# Patient Record
Sex: FEMALE | Race: OTHER | Employment: FULL TIME | URBAN - METROPOLITAN AREA
[De-identification: names, ages, dates, MRNs, and addresses within clinical notes are randomized per-mention and may not be internally consistent; named-entity substitution may affect disease eponyms.]

---

## 2019-08-23 ENCOUNTER — HOSPITAL ENCOUNTER (EMERGENCY)
Age: 34
Discharge: HOME | End: 2019-08-23

## 2019-08-23 VITALS
WEIGHT: 140 LBS | RESPIRATION RATE: 16 BRPM | HEIGHT: 60 IN | OXYGEN SATURATION: 99 % | SYSTOLIC BLOOD PRESSURE: 133 MMHG | DIASTOLIC BLOOD PRESSURE: 81 MMHG | TEMPERATURE: 98.2 F | BODY MASS INDEX: 27.48 KG/M2 | HEART RATE: 71 BPM

## 2019-08-23 DIAGNOSIS — N75.1 BARTHOLIN'S GLAND ABSCESS: Primary | ICD-10-CM

## 2019-08-23 PROCEDURE — 6360000002 HC RX W HCPCS: Performed by: PHYSICIAN ASSISTANT

## 2019-08-23 PROCEDURE — 99282 EMERGENCY DEPT VISIT SF MDM: CPT

## 2019-08-23 PROCEDURE — 4500000022 HC ED LEVEL 2 PROCEDURE

## 2019-08-23 PROCEDURE — 90471 IMMUNIZATION ADMIN: CPT | Performed by: PHYSICIAN ASSISTANT

## 2019-08-23 PROCEDURE — 90715 TDAP VACCINE 7 YRS/> IM: CPT | Performed by: PHYSICIAN ASSISTANT

## 2019-08-23 RX ORDER — CEPHALEXIN 500 MG/1
500 CAPSULE ORAL 4 TIMES DAILY
Qty: 40 CAPSULE | Refills: 0 | Status: SHIPPED | OUTPATIENT
Start: 2019-08-23 | End: 2019-09-02

## 2019-08-23 RX ORDER — SULFAMETHOXAZOLE AND TRIMETHOPRIM 800; 160 MG/1; MG/1
1 TABLET ORAL 2 TIMES DAILY
Qty: 20 TABLET | Refills: 0 | Status: SHIPPED | OUTPATIENT
Start: 2019-08-23 | End: 2019-09-02

## 2019-08-23 RX ORDER — NAPROXEN 500 MG/1
500 TABLET ORAL 2 TIMES DAILY PRN
Qty: 20 TABLET | Refills: 0 | Status: SHIPPED | OUTPATIENT
Start: 2019-08-23 | End: 2019-09-02

## 2019-08-23 RX ADMIN — TETANUS TOXOID, REDUCED DIPHTHERIA TOXOID AND ACELLULAR PERTUSSIS VACCINE, ADSORBED 0.5 ML: 5; 2.5; 8; 8; 2.5 SUSPENSION INTRAMUSCULAR at 07:32

## 2019-08-23 ASSESSMENT — ENCOUNTER SYMPTOMS
COLOR CHANGE: 1
SHORTNESS OF BREATH: 0
ABDOMINAL PAIN: 0
DIARRHEA: 0
NAUSEA: 0
CHEST TIGHTNESS: 0
VOMITING: 0

## 2019-08-23 ASSESSMENT — PAIN SCALES - GENERAL: PAINLEVEL_OUTOF10: 8

## 2021-05-20 ENCOUNTER — HOSPITAL ENCOUNTER (EMERGENCY)
Age: 36
Discharge: HOME | End: 2021-05-20

## 2021-05-20 VITALS
DIASTOLIC BLOOD PRESSURE: 61 MMHG | SYSTOLIC BLOOD PRESSURE: 118 MMHG | RESPIRATION RATE: 16 BRPM | HEART RATE: 84 BPM | OXYGEN SATURATION: 100 % | TEMPERATURE: 99.5 F

## 2021-05-20 DIAGNOSIS — N75.0 INFECTED CYST OF BARTHOLIN'S GLAND DUCT: Primary | ICD-10-CM

## 2021-05-20 PROCEDURE — 56420 I&D BARTHOLINS GLAND ABSCESS: CPT

## 2021-05-20 PROCEDURE — 99283 EMERGENCY DEPT VISIT LOW MDM: CPT

## 2021-05-20 PROCEDURE — 6370000000 HC RX 637 (ALT 250 FOR IP): Performed by: NURSE PRACTITIONER

## 2021-05-20 RX ORDER — SODIUM BICARBONATE 42 MG/ML
INJECTION, SOLUTION INTRAVENOUS
Status: DISCONTINUED
Start: 2021-05-20 | End: 2021-05-20 | Stop reason: HOSPADM

## 2021-05-20 RX ORDER — IBUPROFEN 800 MG/1
800 TABLET ORAL EVERY 8 HOURS PRN
Qty: 20 TABLET | Refills: 0 | Status: SHIPPED | OUTPATIENT
Start: 2021-05-20 | End: 2022-07-06

## 2021-05-20 RX ORDER — LIDOCAINE HYDROCHLORIDE AND EPINEPHRINE BITARTRATE 20; .01 MG/ML; MG/ML
INJECTION, SOLUTION SUBCUTANEOUS
Status: DISCONTINUED
Start: 2021-05-20 | End: 2021-05-20 | Stop reason: HOSPADM

## 2021-05-20 RX ORDER — IBUPROFEN 800 MG/1
800 TABLET ORAL ONCE
Status: COMPLETED | OUTPATIENT
Start: 2021-05-20 | End: 2021-05-20

## 2021-05-20 RX ORDER — HYDROCODONE BITARTRATE AND ACETAMINOPHEN 5; 325 MG/1; MG/1
1 TABLET ORAL EVERY 6 HOURS PRN
Qty: 10 TABLET | Refills: 0 | Status: SHIPPED | OUTPATIENT
Start: 2021-05-20 | End: 2021-05-23

## 2021-05-20 RX ORDER — SULFAMETHOXAZOLE AND TRIMETHOPRIM 800; 160 MG/1; MG/1
1 TABLET ORAL 2 TIMES DAILY
Qty: 20 TABLET | Refills: 0 | Status: SHIPPED | OUTPATIENT
Start: 2021-05-20 | End: 2021-05-30

## 2021-05-20 RX ORDER — SULFAMETHOXAZOLE AND TRIMETHOPRIM 800; 160 MG/1; MG/1
1 TABLET ORAL ONCE
Status: COMPLETED | OUTPATIENT
Start: 2021-05-20 | End: 2021-05-20

## 2021-05-20 RX ORDER — CEPHALEXIN 250 MG/1
500 CAPSULE ORAL ONCE
Status: COMPLETED | OUTPATIENT
Start: 2021-05-20 | End: 2021-05-20

## 2021-05-20 RX ORDER — CEPHALEXIN 500 MG/1
500 CAPSULE ORAL 4 TIMES DAILY
Qty: 40 CAPSULE | Refills: 0 | Status: SHIPPED | OUTPATIENT
Start: 2021-05-20 | End: 2021-05-30

## 2021-05-20 RX ADMIN — CEPHALEXIN 500 MG: 250 CAPSULE ORAL at 21:44

## 2021-05-20 RX ADMIN — SULFAMETHOXAZOLE AND TRIMETHOPRIM 1 TABLET: 800; 160 TABLET ORAL at 21:44

## 2021-05-20 RX ADMIN — IBUPROFEN 800 MG: 800 TABLET, FILM COATED ORAL at 21:44

## 2021-05-20 ASSESSMENT — ENCOUNTER SYMPTOMS
DIARRHEA: 0
SHORTNESS OF BREATH: 0
NAUSEA: 0
ABDOMINAL PAIN: 0
CHEST TIGHTNESS: 0
VOMITING: 0

## 2021-05-20 ASSESSMENT — PAIN SCALES - GENERAL: PAINLEVEL_OUTOF10: 9

## 2021-05-20 NOTE — LETTER
[facility] Emergency Department  [address] [facility], [address]             May 20, 2021    Patient: Liss Toth   YOB: 1985   Date of Visit: 5/20/2021       To Whom It May Concern:    Liss Toth was seen and treated in our emergency department on 5/20/2021. She may return to work on 5/23/21.       Sincerely,         [facility] ER

## 2021-05-20 NOTE — ED PROVIDER NOTES
[facility]        Pt Name: Liss Toth  MRN: [id number]  [place] 1985  Date of evaluation: 5/20/2021  Provider: LOUISE Frias CNP  PCP: No primary care provider on file. Note Started: 6:30 PM EDT       FRANCINE. I have evaluated this patient. My supervising physician was available for consultation. CHIEF COMPLAINT       Chief Complaint   Patient presents with    Abscess     abscess inside of right thigh into groin       HISTORY OF PRESENT ILLNESS   (Location, Timing/Onset, Context/Setting, Quality, Duration, Modifying Factors, Severity, Associated Signs and Symptoms)  Note limiting factors. Liss Toth is a 43 y.o. female who presents with a Chief Complaint of presents to the emergency department with right-sided labial abscess versus Bartholin abscess. Patient reports that this is the fourth episode that this is occurred over the past 10 years. Reports that the area does have to be drained and she experiences immediate pain relief. Patient does not follow regularly with gynecology. Severe pressure-like pain, burning in nature rates a 6 of 10 without mitigating or exacerbating factor. Onset 2 days ago, worsening since time of onset. Denies any headache, fever, lightheadedness, dizziness, visual disturbances. No chest pain or pressure. No neck or back pain. No shortness of breath, cough, or congestion. No abdominal pain, nausea, vomiting, diarrhea, constipation, or dysuria. No rash. Nursing Notes were all reviewed and agreed with or any disagreements were addressed in the HPI. REVIEW OF SYSTEMS    (2-9 systems for level 4, 10 or more for level 5)     Review of Systems   Constitutional: Negative for activity change, chills and fever. Respiratory: Negative for chest tightness and shortness of breath. Cardiovascular: Negative for chest pain.    Gastrointestinal: Negative for abdominal treatment:  Wound left open    Packing materials:  None  Post-procedure details:     Patient tolerance of procedure: Tolerated well, no immediate complications        CRITICAL CARE TIME   N/A    CONSULTS:  None      EMERGENCY DEPARTMENT COURSE and DIFFERENTIAL DIAGNOSIS/MDM:   Vitals:    Vitals:    05/20/21 1821   BP: 118/61   Pulse: 84   Resp: 16   Temp: 99.5 °F (37.5 °C)   SpO2: 100%       Patient was given the following medications:  Medications   lidocaine-EPINEPHrine 2 percent-1:601914 injection (has no administration in time range)   sodium bicarbonate 4.2 % injection (has no administration in time range)   cephALEXin (KEFLEX) capsule 500 mg (500 mg Oral Given 5/20/21 2144)   sulfamethoxazole-trimethoprim (BACTRIM DS;SEPTRA DS) 800-160 MG per tablet 1 tablet (1 tablet Oral Given 5/20/21 2144)   ibuprofen (ADVIL;MOTRIN) tablet 800 mg (800 mg Oral Given 5/20/21 2144)           Briefly, this is a 43year old female that  who presents with a Chief Complaint of presents to the emergency department with right-sided labial abscess versus Bartholin abscess. Patient reports that this is the fourth episode that this is occurred over the past 10 years. Reports that the area does have to be drained and she experiences immediate pain relief. Patient does not follow regularly with gynecology. Severe pressure-like pain, burning in nature rates a 6 of 10 without mitigating or exacerbating factor. Onset 2 days ago, worsening since time of onset. The area was incised as described above. Patient was placed on Bactrim and Keflex. Given pain medications and a referral to gynecology for Bartholin gland removal.    I estimate there is LOW risk for SEVERE CELLULITIS, SEPSIS OR NECROTIZING FASCIITIS,  thus I consider the discharge disposition reasonable. Patient was advised to follow-up with their family doctor within 48-72 hours for packing removal and wound check.   If patient is unable to get in to see their family doctor then they have been advised to return to the emergency department within 48-72 hours for packing removal and wound check. FINAL IMPRESSION      1. Infected cyst of Bartholin's gland duct          DISPOSITION/PLAN   DISPOSITION Decision To Discharge 05/20/2021 09:42:20 PM      PATIENT REFERRED TO:  Anne Briggs MD  [facility], [address] [place] [address]  [phone]            DISCHARGE MEDICATIONS:  New Prescriptions    CEPHALEXIN (KEFLEX) 500 MG CAPSULE    Take 1 capsule by mouth 4 times daily for 10 days    HYDROCODONE-ACETAMINOPHEN (NORCO) 5-325 MG PER TABLET    Take 1 tablet by mouth every 6 hours as needed for Pain for up to 3 days.     IBUPROFEN (ADVIL;MOTRIN) 800 MG TABLET    Take 1 tablet by mouth every 8 hours as needed for Pain or Fever    SULFAMETHOXAZOLE-TRIMETHOPRIM (BACTRIM DS) 800-160 MG PER TABLET    Take 1 tablet by mouth 2 times daily for 10 days       DISCONTINUED MEDICATIONS:  Discontinued Medications    No medications on file              (Please note that portions of this note were completed with a voice recognition program.  Efforts were made to edit the dictations but occasionally words are mis-transcribed.)    LOUISE Navarrete CNP (electronically signed)           LOUISE Navarrete CNP  05/20/21 4129

## 2022-05-03 ENCOUNTER — HOSPITAL ENCOUNTER (EMERGENCY)
Age: 37
Discharge: HOME | End: 2022-05-03

## 2022-05-03 VITALS
RESPIRATION RATE: 18 BRPM | TEMPERATURE: 99.2 F | BODY MASS INDEX: 25.33 KG/M2 | WEIGHT: 152 LBS | SYSTOLIC BLOOD PRESSURE: 130 MMHG | HEIGHT: 65 IN | HEART RATE: 91 BPM | DIASTOLIC BLOOD PRESSURE: 85 MMHG | OXYGEN SATURATION: 99 %

## 2022-05-03 DIAGNOSIS — N75.1 BARTHOLIN'S GLAND ABSCESS: Primary | ICD-10-CM

## 2022-05-03 PROCEDURE — 99282 EMERGENCY DEPT VISIT SF MDM: CPT

## 2022-05-03 PROCEDURE — 56420 I&D BARTHOLINS GLAND ABSCESS: CPT

## 2022-05-03 ASSESSMENT — PAIN - FUNCTIONAL ASSESSMENT: PAIN_FUNCTIONAL_ASSESSMENT: 0-10

## 2022-05-03 ASSESSMENT — ENCOUNTER SYMPTOMS
SHORTNESS OF BREATH: 0
COLOR CHANGE: 0
DIARRHEA: 0
BACK PAIN: 0
SORE THROAT: 0
COUGH: 0
WHEEZING: 0
VOMITING: 0
ABDOMINAL PAIN: 0
NAUSEA: 0

## 2022-05-03 ASSESSMENT — PAIN SCALES - GENERAL: PAINLEVEL_OUTOF10: 9

## 2022-05-03 ASSESSMENT — PAIN DESCRIPTION - LOCATION: LOCATION: LEG

## 2022-05-03 ASSESSMENT — PAIN DESCRIPTION - ORIENTATION: ORIENTATION: RIGHT

## 2022-05-03 ASSESSMENT — PAIN DESCRIPTION - PAIN TYPE: TYPE: ACUTE PAIN

## 2022-05-03 ASSESSMENT — PAIN DESCRIPTION - FREQUENCY: FREQUENCY: CONTINUOUS

## 2022-05-03 NOTE — ED NOTES
Pt Discharged in stable condition, VSS, no signs of distress, discharge instructions and meds reviewed. Pt verbalizes understanding and states no further questions or concerns unaddressed.        Rico Alvarez RN  05/03/22 7329

## 2022-05-03 NOTE — LETTER
[facility]  [address] [place] [address], [address]             May 3, 2022    Patient: Aditi Dow   YOB: 1985   Date of Visit: 5/3/2022       To Whom It May Concern:    Aditi Dow was seen and treated in our emergency department on 5/3/2022. She may return to work on 05/04/2022.       Sincerely,         [facility]

## 2022-05-03 NOTE — ED PROVIDER NOTES
**ADVANCED PRACTICE PROVIDER, I HAVE EVALUATED THIS PATIENT**        [facility] ENCOUNTER      Pt Name: Ze Mistry  GABRIEL:[id number]  [place] 1985  Date of evaluation: 5/3/2022  Provider: OLUISE Reyes CNP      Chief Complaint:    Chief Complaint   Patient presents with    Abscess     arrived per self d/t possible abscess to right inner thigh with severe pain         Nursing Notes, Past Medical Hx, Past Surgical Hx, Social Hx, Allergies, and Family Hx were all reviewed and agreed with or any disagreements were addressed in the HPI.    HPI: (Location, Duration, Timing, Severity, Quality, Assoc Sx, Context, Modifying factors)    Chief Complaint of vaginal abscess    This is a  [address] y.o. female who presents to the emergency department abscess on the right inner labia, patient states she noticed it about 2 days ago however she is now having worsening discomfort. She denies any abnormal vaginal bleeding or discharge. She never had this before. She rates her pain a 9 out of 10, she denies taking any medicine for the pain, denies any drainage, denies any concern for pregnancy, no abnormal vaginal bleeding or discharge. She denies any additional complaints, no additional aggravating relieving factors. The patient presents awake, alert and in no acute respiratory distress or toxic appearance    PastMedical/Surgical History:  History reviewed. No pertinent past medical history.       Procedure Laterality Date     SECTION         Medications:  Discharge Medication List as of 5/3/2022  8:45 AM      CONTINUE these medications which have NOT CHANGED    Details   ibuprofen (ADVIL;MOTRIN) 800 MG tablet Take 1 tablet by mouth every 8 hours as needed for Pain or Fever, Disp-20 tablet, R-0Print      naproxen (NAPROSYN) 500 MG tablet Take 1 tablet by mouth 2 times daily as needed for Pain, Disp-20 tablet, R-0Print               Review of Systems:  (2-9 systems needed)  Review of Systems   Constitutional: Negative for chills and fever. HENT: Negative for congestion and sore throat. Respiratory: Negative for cough, shortness of breath and wheezing. Cardiovascular: Negative for chest pain. Gastrointestinal: Negative for abdominal pain, diarrhea, nausea and vomiting. Genitourinary: Positive for vaginal pain. Negative for difficulty urinating, dysuria, frequency, hematuria and urgency. Patient reports swelling and pain in the right inner vagina, she states that she has had this for the past 2 days. She denies any open wounds or changes, no abnormal discharge or concern for pregnancy   Musculoskeletal: Negative for back pain. Skin: Positive for wound. Negative for color change. Neurological: Negative for weakness, numbness and headaches. \"Positives and Pertinent negatives as per HPI\"    Physical Exam:  Physical Exam  Vitals and nursing note reviewed. Constitutional:       Appearance: She is well-developed. She is not diaphoretic. HENT:      Head: Normocephalic. Right Ear: External ear normal.      Left Ear: External ear normal.   Eyes:      General:         Right eye: No discharge. Left eye: No discharge. Cardiovascular:      Rate and Rhythm: Normal rate. Pulmonary:      Effort: Pulmonary effort is normal. No respiratory distress. Breath sounds: Normal breath sounds. Genitourinary:     Comments: Patient has a large Bartholin's abscess in the right side of the external labia, there is swelling, redness, tenderness and fluctuation. There is no open wounds, lacerations, abrasions or obvious drainage. Musculoskeletal:         General: Normal range of motion. Cervical back: Normal range of motion and neck supple. Skin:     General: Skin is warm. Capillary Refill: Capillary refill takes less than 2 seconds. Coloration: Skin is not pale.    Neurological:      Mental Status: She is alert and oriented to person, place, and time. GCS: GCS eye subscore is 4. GCS verbal subscore is 5. GCS motor subscore is 6. Psychiatric:         Behavior: Behavior normal.         MEDICAL DECISION MAKING    Vitals:    Vitals:    05/03/22 0725   BP: 130/85   Pulse: 91   Resp: 18   Temp: 99.2 °F (37.3 °C)   TempSrc: Oral   SpO2: 99%   Weight: 152 lb (68.9 kg)   Height: 5' 5\" (1.651 m)       LABS:Labs Reviewed - No data to display     Remainder of labs reviewed and were negative at this time or not returned at the time of this note. RADIOLOGY:   Non-plain film images such as CT, Ultrasound and MRI are read by the radiologist. Tasha GARZA APRN - CNP have directly visualized the radiologic plain film image(s) with the below findings:      Interpretation per the Radiologist below, if available at the time of this note:    No orders to display        No results found. MEDICAL DECISION MAKING / ED COURSE:      PROCEDURES:   Procedures    Incision and Drainage Procedure Note    Indication: Bartholin's cyst    Procedure: The patient was positioned appropriately and the skin over the incision site was prepped with betadine and draped in a sterile fashion. Local anesthesia was obtained by infiltration using 1% Lidocaine with epinephrine. An incision was then made over the apex of the lesion and approximately 20 cc of tenacious, foul smelling, serosanguineous and yellow material was expressed. Loculations were not present. The drainage cavity was then irrigated and dressed with gauze. The patients tetanus status was up to date and did not require a booster dose. The patient tolerated the procedure well. Complications: None      Patient was given:  Medications - No data to display    All patient's care was interpreted using . Patient presents with abscess on the right inner labia, patient states she noticed it about 2 days ago however she is now having worsening discomfort.   She denies any abnormal vaginal bleeding or discharge. She never had this before. She rates her pain a 9 out of 10, she denies taking any medicine for the pain, denies any drainage, denies any concern for pregnancy, no abnormal vaginal bleeding or discharge. Patient presents with a Bartholin abscess of the right inner external labial region, the area is fluctuant and indurated. I did perform an I&D procedure, see procedure note. Patient tolerated the procedure well, because is her first time ever having an abscess I do not feel sensory to do antibiotic therapy however I do recommend that the patient follow-up with her gynecologist as this could possibly develop again. However, at this time I estimate there is LOW risk for SEVERE CELLULITIS, SEPSIS OR NECROTIZING FASCIITIS,  thus I consider the discharge disposition reasonable    Patient was advised to follow-up with their family doctor within 48-72 hours for packing wound check. If patient is unable to get in to see their family doctor then they have been advised to return to the emergency department within 48-72 hours for wound check. The patient tolerated their visit well. I evaluated the patient. The physician was available for consultation as needed. The patient and / or the family were informed of the results of any tests, a time was given to answer questions, a plan was proposed and they agreed with plan. Patient verbalized understanding of discharge instructions and the patient was discharged from the department in stable condition. CLINICAL IMPRESSION:  1.  Bartholin's gland abscess        DISPOSITION Decision To Discharge 05/03/2022 08:37:51 AM      PATIENT REFERRED TO:  [facility] [address]  [address] [place] [address] [address]  [address] [facility] [address] [phone]  Call   This gynecologist today make an appointment for follow-up to be seen in the next 2 days    [facility] Emergency Department  [place] [address]  [phone]  Go to   If symptoms worsen      DISCHARGE MEDICATIONS:  Discharge Medication List as of 5/3/2022  8:45 AM          DISCONTINUED MEDICATIONS:  Discharge Medication List as of 5/3/2022  8:45 AM                 (Please note the MDM and HPI sections of this note were completed with a voice recognition program.  Efforts were made to edit the dictations but occasionally words are mis-transcribed.)    Electronically signed, LOUISE Ross CNP,           LOUISE Ross CNP  05/03/22 1451

## 2022-07-06 ENCOUNTER — HOSPITAL ENCOUNTER (EMERGENCY)
Age: 37
Discharge: HOME | End: 2022-07-06
Payer: COMMERCIAL

## 2022-07-06 VITALS
RESPIRATION RATE: 16 BRPM | SYSTOLIC BLOOD PRESSURE: 125 MMHG | TEMPERATURE: 98.2 F | HEART RATE: 77 BPM | OXYGEN SATURATION: 98 % | DIASTOLIC BLOOD PRESSURE: 71 MMHG

## 2022-07-06 DIAGNOSIS — R10.2 VAGINAL PAIN: Primary | ICD-10-CM

## 2022-07-06 LAB
GLUCOSE BLD-MCNC: 86 MG/DL (ref 70–99)
PERFORMED ON: NORMAL

## 2022-07-06 PROCEDURE — 99283 EMERGENCY DEPT VISIT LOW MDM: CPT

## 2022-07-06 PROCEDURE — 6370000000 HC RX 637 (ALT 250 FOR IP): Performed by: NURSE PRACTITIONER

## 2022-07-06 RX ORDER — IBUPROFEN 600 MG/1
600 TABLET ORAL 4 TIMES DAILY PRN
Qty: 40 TABLET | Refills: 0 | Status: SHIPPED | OUTPATIENT
Start: 2022-07-06

## 2022-07-06 RX ORDER — HYDROCODONE BITARTRATE AND ACETAMINOPHEN 5; 325 MG/1; MG/1
1 TABLET ORAL ONCE
Status: COMPLETED | OUTPATIENT
Start: 2022-07-06 | End: 2022-07-06

## 2022-07-06 RX ORDER — HYDROCODONE BITARTRATE AND ACETAMINOPHEN 5; 325 MG/1; MG/1
1 TABLET ORAL EVERY 4 HOURS PRN
Qty: 18 TABLET | Refills: 0 | Status: SHIPPED | OUTPATIENT
Start: 2022-07-06 | End: 2022-07-09

## 2022-07-06 RX ORDER — IBUPROFEN 600 MG/1
600 TABLET ORAL ONCE
Status: COMPLETED | OUTPATIENT
Start: 2022-07-06 | End: 2022-07-06

## 2022-07-06 RX ORDER — CLINDAMYCIN HYDROCHLORIDE 150 MG/1
450 CAPSULE ORAL ONCE
Status: COMPLETED | OUTPATIENT
Start: 2022-07-06 | End: 2022-07-06

## 2022-07-06 RX ORDER — CLINDAMYCIN HYDROCHLORIDE 150 MG/1
450 CAPSULE ORAL 4 TIMES DAILY
Qty: 120 CAPSULE | Refills: 0 | Status: SHIPPED | OUTPATIENT
Start: 2022-07-06 | End: 2022-07-16

## 2022-07-06 RX ADMIN — HYDROCODONE BITARTRATE AND ACETAMINOPHEN 1 TABLET: 5; 325 TABLET ORAL at 03:04

## 2022-07-06 RX ADMIN — CLINDAMYCIN HYDROCHLORIDE 450 MG: 150 CAPSULE ORAL at 03:04

## 2022-07-06 RX ADMIN — IBUPROFEN 600 MG: 600 TABLET ORAL at 03:04

## 2022-07-06 ASSESSMENT — ENCOUNTER SYMPTOMS
CHEST TIGHTNESS: 0
VOMITING: 0
NAUSEA: 0
DIARRHEA: 0
SHORTNESS OF BREATH: 0
ABDOMINAL PAIN: 0

## 2022-07-06 ASSESSMENT — PAIN - FUNCTIONAL ASSESSMENT: PAIN_FUNCTIONAL_ASSESSMENT: 0-10

## 2022-07-06 ASSESSMENT — PAIN SCALES - GENERAL: PAINLEVEL_OUTOF10: 8

## 2022-07-06 NOTE — Clinical Note
Zhang Mendoza was seen and treated in our emergency department on 7/6/2022. She may return to work on 07/07/2022. If you have any questions or concerns, please don't hesitate to call.       Enrrique Hardin, APRN - CNP

## 2022-07-06 NOTE — ED PROVIDER NOTES
[facility]        Pt Name: Deondre Luis  MRN: [id number]  [place] 1985  Date of evaluation: 7/6/2022  Provider: LOUISE Bolton CNP  PCP: Clau Gay MD  Note Started: 3:03 AM EDT       FRANCINE. I have evaluated this patient. My supervising physician was available for consultation. CHIEF COMPLAINT       Chief Complaint   Patient presents with    Abscess     pt niece states that she has vaginal abscess that she opened herself and she wants to make sure its not infected. pt states cant afford surgery. HISTORY OF PRESENT ILLNESS   (Location, Timing/Onset, Context/Setting, Quality, Duration, Modifying Factors, Severity, Associated Signs and Symptoms)  Note limiting factors. Chief Complaint: Anika Luis is a 42 y.o. female who presents to the emergency department with complaints of a known Bartholin's gland abscess. This is the sixth occurrence. Patient states that she does not have insurance and was unable to follow-up with her OB/GYN for surgery. Pain has been ongoing for 2 days. Patient states she has been taking ibuprofen at home with short-term relief of pain. She states increasing pain upon movement. Denies any headache, fever, lightheadedness, dizziness, visual disturbances. No chest pain or pressure. No neck or back pain. No shortness of breath, cough, or congestion. No abdominal pain, nausea, vomiting, diarrhea, constipation, or dysuria. No rash. Nursing Notes were all reviewed and agreed with or any disagreements were addressed in the HPI. REVIEW OF SYSTEMS    (2-9 systems for level 4, 10 or more for level 5)     Review of Systems   Constitutional: Negative for activity change, chills and fever. Respiratory: Negative for chest tightness and shortness of breath. Cardiovascular: Negative for chest pain.    Gastrointestinal: Negative for abdominal pain, diarrhea, No vaginal discharge. Musculoskeletal:         General: Normal range of motion. Cervical back: Normal range of motion and neck supple. Skin:     General: Skin is warm and dry. Coloration: Skin is not pale. Neurological:      Mental Status: She is alert and oriented to person, place, and time. Psychiatric:         Behavior: Behavior normal.         DIAGNOSTIC RESULTS   LABS:    Labs Reviewed   POCT GLUCOSE   POCT GLUCOSE       When ordered only abnormal lab results are displayed. All other labs were within normal range or not returned as of this dictation. EKG: When ordered, EKG's are interpreted by the Emergency Department Physician in the absence of a cardiologist.  Please see their note for interpretation of EKG. RADIOLOGY:   Non-plain film images such as CT, Ultrasound and MRI are read by the radiologist. Plain radiographic images are visualized and preliminarily interpreted by the ED Provider with the below findings:        Interpretation per the Radiologist below, if available at the time of this note:    No orders to display     No results found. PROCEDURES   Unless otherwise noted below, none     Procedures    CRITICAL CARE TIME       CONSULTS:  None      EMERGENCY DEPARTMENT COURSE and DIFFERENTIAL DIAGNOSIS/MDM:   Vitals:    Vitals:    07/06/22 0137   BP: 125/71   Pulse: 77   Resp: 16   Temp: 98.2 °F (36.8 °C)   TempSrc: Oral   SpO2: 98%       Patient was given the following medications:  Medications   HYDROcodone-acetaminophen (NORCO) 5-325 MG per tablet 1 tablet (1 tablet Oral Given 7/6/22 0304)   ibuprofen (ADVIL;MOTRIN) tablet 600 mg (600 mg Oral Given 7/6/22 0304)   clindamycin (CLEOCIN) capsule 450 mg (450 mg Oral Given 7/6/22 0304)         Is this patient to be included in the SEP-1 Core Measure due to severe sepsis or septic shock? No   Exclusion criteria - the patient is NOT to be included for SEP-1 Core Measure due to:   Infection is not suspected    Briefly, this is a 64-year-old female with past medical history of Bartholin gland abscess. Patient presents to the emergency department today with her 6th known occurrence. Patient states pain for 2 days. She notes that she was unable to follow-up with her OB/GYN due to lack of insurance.  used. The patient is agreeable that she can follow-up with OB/GYN tomorrow. She is given pain medications and antibiotics and discharged home. She had mild swelling noted at the right labia. No fluctuance noted. Strict return precautions and close outpatient follow up discussed. I estimate there is LOW risk for SEVERE CELLULITIS, SEPSIS OR NECROTIZING FASCIITIS,  thus I consider the discharge disposition reasonable. Patient was advised to follow-up with their gyn doctor within 48-72 hours. FINAL IMPRESSION      1. Vaginal pain          DISPOSITION/PLAN   DISPOSITION Decision To Discharge 07/06/2022 02:54:13 AM      PATIENT REFERRED TO:  Oly Katz MD  [address] [facility] [address]  [phone]    Schedule an appointment as soon as possible for a visit         DISCHARGE MEDICATIONS:  New Prescriptions    CLINDAMYCIN (CLEOCIN) 150 MG CAPSULE    Take 3 capsules by mouth 4 times daily for 10 days    HYDROCODONE-ACETAMINOPHEN (NORCO) 5-325 MG PER TABLET    Take 1 tablet by mouth every 4 hours as needed for Pain for up to 3 days. Intended supply: 3 days.  Take lowest dose possible to manage pain    IBUPROFEN (ADVIL;MOTRIN) 600 MG TABLET    Take 1 tablet by mouth 4 times daily as needed for Pain       DISCONTINUED MEDICATIONS:  Discontinued Medications    IBUPROFEN (ADVIL;MOTRIN) 800 MG TABLET    Take 1 tablet by mouth every 8 hours as needed for Pain or Fever              (Please note that portions of this note were completed with a voice recognition program.  Efforts were made to edit the dictations but occasionally words are mis-transcribed.)    Lucretia Montoya, APRN - CNP (electronically signed)            Swapna Mccann, LOUISE - CNP  07/06/22 8478

## 2022-12-10 ENCOUNTER — APPOINTMENT (OUTPATIENT)
Dept: GENERAL RADIOLOGY | Age: 37
End: 2022-12-10
Payer: COMMERCIAL

## 2022-12-10 ENCOUNTER — HOSPITAL ENCOUNTER (EMERGENCY)
Age: 37
Discharge: HOME | End: 2022-12-10
Attending: EMERGENCY MEDICINE
Payer: COMMERCIAL

## 2022-12-10 ENCOUNTER — APPOINTMENT (OUTPATIENT)
Dept: CT IMAGING | Age: 37
End: 2022-12-10
Payer: COMMERCIAL

## 2022-12-10 VITALS
HEART RATE: 65 BPM | OXYGEN SATURATION: 100 % | WEIGHT: 140 LBS | DIASTOLIC BLOOD PRESSURE: 75 MMHG | RESPIRATION RATE: 13 BRPM | TEMPERATURE: 98.6 F | HEIGHT: 65 IN | BODY MASS INDEX: 23.32 KG/M2 | SYSTOLIC BLOOD PRESSURE: 113 MMHG

## 2022-12-10 DIAGNOSIS — R10.9 ACUTE ABDOMINAL PAIN: Primary | ICD-10-CM

## 2022-12-10 DIAGNOSIS — S70.10XA CONTUSION OF HIP AND THIGH, INITIAL ENCOUNTER: ICD-10-CM

## 2022-12-10 DIAGNOSIS — S70.00XA CONTUSION OF HIP AND THIGH, INITIAL ENCOUNTER: ICD-10-CM

## 2022-12-10 LAB
A/G RATIO: 1.3 (ref 1.1–2.2)
ALBUMIN SERPL-MCNC: 4 G/DL (ref 3.4–5)
ALP BLD-CCNC: 83 U/L (ref 40–129)
ALT SERPL-CCNC: 20 U/L (ref 10–40)
ANION GAP SERPL CALCULATED.3IONS-SCNC: 8 MMOL/L (ref 3–16)
AST SERPL-CCNC: 18 U/L (ref 15–37)
BASOPHILS ABSOLUTE: 0 K/UL (ref 0–0.2)
BASOPHILS RELATIVE PERCENT: 0.3 %
BILIRUB SERPL-MCNC: <0.2 MG/DL (ref 0–1)
BILIRUBIN URINE: NEGATIVE
BLOOD, URINE: NEGATIVE
BUN BLDV-MCNC: 10 MG/DL (ref 7–20)
CALCIUM SERPL-MCNC: 9.8 MG/DL (ref 8.3–10.6)
CHLORIDE BLD-SCNC: 108 MMOL/L (ref 99–110)
CLARITY: CLEAR
CO2: 23 MMOL/L (ref 21–32)
COLOR: YELLOW
CREAT SERPL-MCNC: 0.5 MG/DL (ref 0.6–1.1)
EOSINOPHILS ABSOLUTE: 0 K/UL (ref 0–0.6)
EOSINOPHILS RELATIVE PERCENT: 0.7 %
GFR SERPL CREATININE-BSD FRML MDRD: >60 ML/MIN/{1.73_M2}
GLUCOSE BLD-MCNC: 92 MG/DL (ref 70–99)
GLUCOSE URINE: NEGATIVE MG/DL
HCG QUALITATIVE: NEGATIVE
HCT VFR BLD CALC: 35.4 % (ref 36–48)
HEMOGLOBIN: 11.4 G/DL (ref 12–16)
KETONES, URINE: ABNORMAL MG/DL
LACTIC ACID: 0.7 MMOL/L (ref 0.4–2)
LEUKOCYTE ESTERASE, URINE: NEGATIVE
LYMPHOCYTES ABSOLUTE: 1.5 K/UL (ref 1–5.1)
LYMPHOCYTES RELATIVE PERCENT: 24.4 %
MCH RBC QN AUTO: 25.4 PG (ref 26–34)
MCHC RBC AUTO-ENTMCNC: 32.1 G/DL (ref 31–36)
MCV RBC AUTO: 79 FL (ref 80–100)
MICROSCOPIC EXAMINATION: ABNORMAL
MONOCYTES ABSOLUTE: 0.6 K/UL (ref 0–1.3)
MONOCYTES RELATIVE PERCENT: 9 %
NEUTROPHILS ABSOLUTE: 4.1 K/UL (ref 1.7–7.7)
NEUTROPHILS RELATIVE PERCENT: 65.6 %
NITRITE, URINE: NEGATIVE
PDW BLD-RTO: 14.9 % (ref 12.4–15.4)
PH UA: 6 (ref 5–8)
PLATELET # BLD: 208 K/UL (ref 135–450)
PMV BLD AUTO: 8.3 FL (ref 5–10.5)
POTASSIUM REFLEX MAGNESIUM: 3.6 MMOL/L (ref 3.5–5.1)
PROTEIN UA: NEGATIVE MG/DL
RBC # BLD: 4.48 M/UL (ref 4–5.2)
SODIUM BLD-SCNC: 139 MMOL/L (ref 136–145)
SPECIFIC GRAVITY UA: 1.02 (ref 1–1.03)
TOTAL CK: 127 U/L (ref 26–192)
TOTAL PROTEIN: 7.2 G/DL (ref 6.4–8.2)
URINE REFLEX TO CULTURE: ABNORMAL
URINE TYPE: ABNORMAL
UROBILINOGEN, URINE: 0.2 E.U./DL
WBC # BLD: 6.3 K/UL (ref 4–11)

## 2022-12-10 PROCEDURE — 6360000004 HC RX CONTRAST MEDICATION: Performed by: EMERGENCY MEDICINE

## 2022-12-10 PROCEDURE — 80053 COMPREHEN METABOLIC PANEL: CPT

## 2022-12-10 PROCEDURE — 74177 CT ABD & PELVIS W/CONTRAST: CPT

## 2022-12-10 PROCEDURE — 6360000002 HC RX W HCPCS: Performed by: EMERGENCY MEDICINE

## 2022-12-10 PROCEDURE — 73502 X-RAY EXAM HIP UNI 2-3 VIEWS: CPT

## 2022-12-10 PROCEDURE — 84703 CHORIONIC GONADOTROPIN ASSAY: CPT

## 2022-12-10 PROCEDURE — 82550 ASSAY OF CK (CPK): CPT

## 2022-12-10 PROCEDURE — 85025 COMPLETE CBC W/AUTO DIFF WBC: CPT

## 2022-12-10 PROCEDURE — 99285 EMERGENCY DEPT VISIT HI MDM: CPT

## 2022-12-10 PROCEDURE — 73552 X-RAY EXAM OF FEMUR 2/>: CPT

## 2022-12-10 PROCEDURE — 83605 ASSAY OF LACTIC ACID: CPT

## 2022-12-10 PROCEDURE — 96374 THER/PROPH/DIAG INJ IV PUSH: CPT

## 2022-12-10 PROCEDURE — 96375 TX/PRO/DX INJ NEW DRUG ADDON: CPT

## 2022-12-10 PROCEDURE — 36415 COLL VENOUS BLD VENIPUNCTURE: CPT

## 2022-12-10 PROCEDURE — 2580000003 HC RX 258: Performed by: EMERGENCY MEDICINE

## 2022-12-10 PROCEDURE — 81003 URINALYSIS AUTO W/O SCOPE: CPT

## 2022-12-10 RX ORDER — ONDANSETRON 2 MG/ML
4 INJECTION INTRAMUSCULAR; INTRAVENOUS ONCE
Status: COMPLETED | OUTPATIENT
Start: 2022-12-10 | End: 2022-12-10

## 2022-12-10 RX ORDER — MORPHINE SULFATE 4 MG/ML
4 INJECTION, SOLUTION INTRAMUSCULAR; INTRAVENOUS ONCE
Status: COMPLETED | OUTPATIENT
Start: 2022-12-10 | End: 2022-12-10

## 2022-12-10 RX ORDER — METHOCARBAMOL 750 MG/1
750-1500 TABLET, FILM COATED ORAL EVERY 8 HOURS PRN
Qty: 20 TABLET | Refills: 0 | Status: SHIPPED | OUTPATIENT
Start: 2022-12-10 | End: 2022-12-20

## 2022-12-10 RX ORDER — ORPHENADRINE CITRATE 30 MG/ML
60 INJECTION INTRAMUSCULAR; INTRAVENOUS ONCE
Status: COMPLETED | OUTPATIENT
Start: 2022-12-10 | End: 2022-12-10

## 2022-12-10 RX ORDER — NAPROXEN 500 MG/1
500 TABLET ORAL 2 TIMES DAILY WITH MEALS
Qty: 20 TABLET | Refills: 0 | Status: SHIPPED | OUTPATIENT
Start: 2022-12-10 | End: 2022-12-20

## 2022-12-10 RX ORDER — SODIUM CHLORIDE, SODIUM LACTATE, POTASSIUM CHLORIDE, CALCIUM CHLORIDE 600; 310; 30; 20 MG/100ML; MG/100ML; MG/100ML; MG/100ML
1000 INJECTION, SOLUTION INTRAVENOUS ONCE
Status: COMPLETED | OUTPATIENT
Start: 2022-12-10 | End: 2022-12-10

## 2022-12-10 RX ADMIN — MORPHINE SULFATE 4 MG: 4 INJECTION, SOLUTION INTRAMUSCULAR; INTRAVENOUS at 13:37

## 2022-12-10 RX ADMIN — ORPHENADRINE CITRATE 60 MG: 30 INJECTION INTRAMUSCULAR; INTRAVENOUS at 15:52

## 2022-12-10 RX ADMIN — SODIUM CHLORIDE, POTASSIUM CHLORIDE, SODIUM LACTATE AND CALCIUM CHLORIDE 1000 ML: 600; 310; 30; 20 INJECTION, SOLUTION INTRAVENOUS at 13:48

## 2022-12-10 RX ADMIN — IOPAMIDOL 75 ML: 755 INJECTION, SOLUTION INTRAVENOUS at 14:29

## 2022-12-10 RX ADMIN — ONDANSETRON 4 MG: 2 INJECTION INTRAMUSCULAR; INTRAVENOUS at 13:36

## 2022-12-10 ASSESSMENT — ENCOUNTER SYMPTOMS
ABDOMINAL PAIN: 1
NAUSEA: 0
WHEEZING: 0
RHINORRHEA: 0
DIARRHEA: 0
COUGH: 0
SHORTNESS OF BREATH: 0
VOMITING: 0

## 2022-12-10 ASSESSMENT — LIFESTYLE VARIABLES
HOW OFTEN DO YOU HAVE A DRINK CONTAINING ALCOHOL: NEVER
HOW MANY STANDARD DRINKS CONTAINING ALCOHOL DO YOU HAVE ON A TYPICAL DAY: PATIENT DOES NOT DRINK

## 2022-12-10 ASSESSMENT — PAIN SCALES - GENERAL
PAINLEVEL_OUTOF10: 9
PAINLEVEL_OUTOF10: 10
PAINLEVEL_OUTOF10: 8
PAINLEVEL_OUTOF10: 10

## 2022-12-10 ASSESSMENT — PAIN - FUNCTIONAL ASSESSMENT: PAIN_FUNCTIONAL_ASSESSMENT: 0-10

## 2022-12-10 ASSESSMENT — PAIN DESCRIPTION - LOCATION: LOCATION: ABDOMEN;BACK;HIP

## 2022-12-10 ASSESSMENT — PAIN DESCRIPTION - ORIENTATION: ORIENTATION: RIGHT

## 2022-12-10 NOTE — ED PROVIDER NOTES
[address] [facility]        Pt Name: Seymour Mendoza  MRN: [id number]  Armscatalinagfheriberto 1985  Date of evaluation: 12/10/2022  Provider: Danna Agarwal PA-C  PCP: Britany Aquino MD  Note Started: 4:17 PM EST 12/10/22           I have seen and evaluated this patient with my supervising physician Enrico Heimlich, [facility]       Chief Complaint   Patient presents with    Abdominal Pain     Pt brought in from work by BRINDA. Pt reports stepping off of forklift and the forklift coming back onto her and pushing her into pallets. HISTORY OF PRESENT ILLNESS   (Location, Timing/Onset, Context/Setting, Quality, Duration, Modifying Factors, Severity, Associated Signs and Symptoms)  Note limiting factors. Chief Complaint: Injury     Seymour Mendoza is a 42 y.o. female who presents for evaluation of right hip and lower abdominal pain status post injury that occurred at work prior to arrival.  Patient states that she was stepping off of a forklift and the forklift pushed her into a stack of pallets, wedging her in between. Since had pain to her right lower abdomen and her right hip and femur. She denies any numbness tingling or weakness distally. Skin is intact. No other injuries or complaints at this time. Above information was obtained using . Nursing Notes were all reviewed and agreed with or any disagreements were addressed in the HPI. REVIEW OF SYSTEMS    (2-9 systems for level 4, 10 or more for level 5)     Review of Systems   Constitutional:  Negative for appetite change, chills and fever. HENT:  Negative for congestion and rhinorrhea. Respiratory:  Negative for cough, shortness of breath and wheezing. Cardiovascular:  Negative for chest pain. Gastrointestinal:  Positive for abdominal pain. Negative for diarrhea, nausea and vomiting.    Genitourinary:  Negative for difficulty Visit Date:   06/27/2018      SLEEP MEDICINE FOLLOWUP VISIT NOTE      CHIEF COMPLAINT AND PURPOSE OF VISIT:  Follow up of TORSTEN.       HISTORY OF PRESENT ILLNESS:  Mr. Rojas Stevens is a 48-year-old male who was diagnosed with severe obstructive sleep apnea with an AHI of 31.5 per hour during a home sleep study obtained in 01/2018.  Subsequently, he was prescribed CPAP device.  He was using CPAP fairly regularly until his dog chewed on the mask and he has not been using his device for a while now.  He does not really notice any night and day difference with or without the device and he was a little bit disappointed about that, that he was not really noticing the difference as he had expected.  His girlfriend  reported that there was no snoring when he was using his CPAP and now snoring has been a concern since he has not been using the device.  His weight has not significantly changed since he underwent HST.  At that time, he was 263 pounds and he now weighs 260 pounds.  His bedtime is between 12:00 midnight to 1:00 a.m. and he usually wakes up at 7:00 a.m. and on the weekends, he may sleep a little longer.  He endorses an Gates Sleepiness score of 3/24.      The compliance download had shown that his TORSTEN was adequately controlled.     Current meds:  No current outpatient prescriptions on file.     Past medical history: TORSTEN  Patient Active Problem List   Diagnosis     CARDIOVASCULAR SCREENING; LDL GOAL LESS THAN 160     History of herpes genitalis     Erectile dysfunction, unspecified erectile dysfunction type     Past surgical history:No past surgical history on file.     Allergies:No Known Allergies    Exam:  Vital signs: reviewed, per EMR  General appearance:  in no apparent distress  Pt is dressed casually, cooperative with good eye contact.   Speech is spontaneous with regular rate and volume.   Mood: euthymic; affect congruent with full range and intensity.   Sensorium: awake, alert and oriented to person,  place, time, and situation.       IMPRESSION/ REPORT/PLAN:     1.  Severe obstructive sleep apnea.  The patient has not been using the CPAP device because his mask was damaged by the dog.  He is not that much satisfied with the CPAP treatment since he has not really noticed any significant night and day difference up to the level of his expectation since he was using the CPAP treatment. We discussed the alternate option of   oral appliance through referral to dentistry, but  he wants to  continue using the CPAP device.  He has an appointment to meet with our DME provider to obtain a different mask option.  He was recommended to use the device regularly during sleep and during the entire sleep period to derive the maximum benefit.  Plan to follow up through the sleep therapy management program.    He has delayed sleep phase and based on the history, there is a concern for chronic insufficient sleep, particularly during the work days.  We discussed that some of the symptoms that he is not noticing, improvement in his overall energy levels and not feeling much refreshed upon awakening from sleep, are likely due to insufficient sleep. He was encouraged to advance his bedtime so that he can get at least 7-8 hours of sleep per night . We  discussed the consequences of chronic sleep deprivation.  He had a question about medical marijuana that one of his friends was able to get from [place] for treatment of obstructive sleep apnea and we discussed that it is not an option.  We discussed weight management with diet and exercise.  He was instructed to avoid driving or operating heavy machinery if drowsy or sleepy to prevent accidents.   2.  He is not a known hypertensive, but his blood pressure was elevated today.  I recommended him to monitor his blood pressure, maintain blood pressure logs and following up with his primary care provider if the blood pressure readings are high and he agreed.        The plan is to have him  urinating, dysuria and hematuria. Musculoskeletal:  Positive for arthralgias (R hip/leg pain). Negative for neck pain and neck stiffness. Skin:  Negative for rash. Neurological:  Negative for weakness, numbness and headaches. Positives and Pertinent negatives as per HPI. Except as noted above in the ROS, all other systems were reviewed and negative. PAST MEDICAL HISTORY   History reviewed. No pertinent past medical history. SURGICAL HISTORY     Past Surgical History:   Procedure Laterality Date     SECTION           CURRENTMEDICATIONS       Previous Medications    IBUPROFEN (ADVIL;MOTRIN) 600 MG TABLET    Take 1 tablet by mouth 4 times daily as needed for Pain         ALLERGIES     Patient has no known allergies. FAMILYHISTORY     History reviewed. No pertinent family history. SOCIAL HISTORY       Social History     Tobacco Use    Smoking status: Never    Smokeless tobacco: Never   Substance Use Topics    Alcohol use: No    Drug use: No       SCREENINGS    [place] Coma Scale  Eye Opening: Spontaneous  Best Verbal Response: Oriented  Best Motor Response: Obeys commands  Danial Coma Scale Score: 15        PHYSICAL EXAM    (up to 7 for level 4, 8 or more for level 5)     ED Triage Vitals [12/10/22 1313]   BP Temp Temp Source Heart Rate Resp SpO2 Height Weight   (!) 134/92 98.6 °F (37 °C) Oral 88 24 100 % 5' 5\" (1.651 m) 140 lb (63.5 kg)       Physical Exam  Vitals and nursing note reviewed. Constitutional:       General: She is not in acute distress. Appearance: She is well-developed. She is not ill-appearing, toxic-appearing or diaphoretic. HENT:      Head: Normocephalic and atraumatic. Right Ear: External ear normal.      Left Ear: External ear normal.      Nose: Nose normal.   Eyes:      General:         Right eye: No discharge. Left eye: No discharge. Cardiovascular:      Rate and Rhythm: Normal rate and regular rhythm. Pulses: Normal pulses. "follow up via a virtual visit in the next 4-6 weeks to review the compliance measures.    An overnight oximetry (JACKIE) is being scheduled for tonight while he is using the CPAP with the new mask to check for resolution of the hypoxemia noted during the recent HST and will communicate the results of the JACKIE with him via My Chart.      The above note was dictated using voice recognition software. Although reviewed after completion, some word and grammatical error may remain . Please contact the author for any clarifications.    \"I spent a total of 15 minutes face to face with Rojas Stevens during today's office visit. Over 50% of this time was spent counseling the patient and  coordinating care regarding sleep apnea, CPAP treatment, oral appliance, increasing sleep duration.\"       Mora George MD   of Medicine,  Division of Pulmonary/Sleep Medicine  [facility].             D: 2018   T: 2018   MT: DEE DEE      Name:     ROJAS STEVENS   MRN:      [id number]        Account:      [id number]   :      1969           Visit Date:   2018      Document: [id number]      " Heart sounds: Normal heart sounds. Pulmonary:      Effort: Pulmonary effort is normal. No respiratory distress. Breath sounds: Normal breath sounds. Chest:      Chest wall: No tenderness. Abdominal:      Tenderness: There is abdominal tenderness in the right lower quadrant. There is no right CVA tenderness, guarding or rebound. Musculoskeletal:         General: Normal range of motion. Cervical back: Normal range of motion and neck supple. Right hip: Tenderness present. No bony tenderness or crepitus. Normal range of motion. Right upper leg: Tenderness present. No swelling, edema, deformity or bony tenderness. Legs:    Skin:     General: Skin is warm and dry. Neurological:      Mental Status: She is alert and oriented to person, place, and time. Sensory: Sensation is intact. Motor: Motor function is intact. Psychiatric:         Behavior: Behavior normal.       DIAGNOSTIC RESULTS   LABS:    Labs Reviewed   CBC WITH AUTO DIFFERENTIAL - Abnormal; Notable for the following components:       Result Value    Hemoglobin 11.4 (*)     Hematocrit 35.4 (*)     MCV 79.0 (*)     MCH 25.4 (*)     All other components within normal limits   COMPREHENSIVE METABOLIC PANEL W/ REFLEX TO MG FOR LOW K - Abnormal; Notable for the following components:    Creatinine 0.5 (*)     All other components within normal limits   URINALYSIS WITH REFLEX TO CULTURE - Abnormal; Notable for the following components:    Ketones, Urine TRACE (*)     All other components within normal limits   LACTIC ACID   CK   HCG, SERUM, QUALITATIVE       When ordered only abnormal lab results are displayed. All other labs were within normal range or not returned as of this dictation. EKG: When ordered, EKG's are interpreted by the Emergency Department Physician in the absence of a cardiologist.  Please see their note for interpretation of EKG.     RADIOLOGY:   Non-plain film images such as CT, Ultrasound and MRI are read by the radiologistNgoc Lawrence radiographic images are visualized and preliminarily interpreted by the ED Provider with the below findings:        Interpretation per the Radiologist below, if available at the time of this note:    CT ABDOMEN PELVIS W IV CONTRAST Additional Contrast? None   Final Result   No acute abnormality in the abdomen or pelvis. XR FEMUR RIGHT (MIN 2 VIEWS)   Final Result   No acute osseous abnormality. XR HIP 2-3 VW W PELVIS RIGHT   Final Result   No acute osseous abnormality. XR FEMUR RIGHT (MIN 2 VIEWS)    Result Date: 12/10/2022  EXAMINATION: ONE XRAY VIEW OF THE PELVIS AND TWO XRAY VIEWS RIGHT HIP; 4 XRAY VIEWS OF THE RIGHT FEMUR 12/10/2022 1:49 pm COMPARISON: None. HISTORY: ORDERING SYSTEM PROVIDED HISTORY: crush injury, right hip pain TECHNOLOGIST PROVIDED HISTORY: Reason for exam:->crush injury, right hip pain Reason for Exam: Abdominal Pain (Pt brought in from work by [employer]. Pt reports stepping off of forklift and the forklift coming back onto her and pushing her into pallets. ); ORDERING SYSTEM PROVIDED HISTORY: crush injury, thigh pain TECHNOLOGIST PROVIDED HISTORY: Reason for exam:->crush injury, thigh pain FINDINGS: There is no evidence of acute fracture. There is normal alignment. No acute joint abnormality. No focal osseous lesion. No focal soft tissue abnormality. No acute osseous abnormality. CT ABDOMEN PELVIS W IV CONTRAST Additional Contrast? None    Result Date: 12/10/2022  EXAMINATION: CT OF THE ABDOMEN AND PELVIS WITH CONTRAST 12/10/2022 2:22 pm TECHNIQUE: CT of the abdomen and pelvis was performed with the administration of intravenous contrast. Multiplanar reformatted images are provided for review. Automated exposure control, iterative reconstruction, and/or weight based adjustment of the mA/kV was utilized to reduce the radiation dose to as low as reasonably achievable. COMPARISON: None.  HISTORY: ORDERING SYSTEM PROVIDED HISTORY: crush injury, lower abdominal pain TECHNOLOGIST PROVIDED HISTORY: Additional Contrast?->None Reason for exam:->crush injury, lower abdominal pain Decision Support Exception - unselect if not a suspected or confirmed emergency medical condition->Emergency Medical Condition (MA) Reason for Exam: crush injury, lower abdominal pain FINDINGS: Lower Chest: The lung bases are grossly clear. No pleural effusion. The heart is of normal size. No pericardial effusion. Organs: There are gallstones. There is borderline gallbladder wall thickening without surrounding inflammatory changes. The liver, pancreas, spleen and the bilateral adrenal glands are otherwise within normal limits. There are bilateral extrarenal pelvis. The remainder of bilateral kidneys are within normal limits, without hydronephrosis or obstructive uropathy. GI/Bowel: There is no bowel obstruction or pneumoperitoneum. There is no acute appendicitis. There is no acute diverticulitis. Pelvis: The urinary bladder is within normal limits. There are low-attenuation lesions in the cervix of the uterus, likely related to nabothian cysts. The remainder of the pelvic structures are grossly within normal limits. There is no free pelvic fluid. Peritoneum/Retroperitoneum: There is no ascites or pneumoperitoneum. The abdominal aorta is of normal size. There is no mesenteric or retroperitoneal lymphadenopathy. Bones/Soft Tissues: There is no acute osseous abnormality. There is no acute soft tissue abnormality. No acute abnormality in the abdomen or pelvis. XR HIP 2-3 VW W PELVIS RIGHT    Result Date: 12/10/2022  EXAMINATION: ONE XRAY VIEW OF THE PELVIS AND TWO XRAY VIEWS RIGHT HIP; 4 XRAY VIEWS OF THE RIGHT FEMUR 12/10/2022 1:49 pm COMPARISON: None.  HISTORY: ORDERING SYSTEM PROVIDED HISTORY: crush injury, right hip pain TECHNOLOGIST PROVIDED HISTORY: Reason for exam:->crush injury, right hip pain Reason for Exam: Abdominal Pain (Pt brought in from work by [id number]. Pt reports stepping off of forklift and the forklift coming back onto her and pushing her into pallets. ); ORDERING SYSTEM PROVIDED HISTORY: crush injury, thigh pain TECHNOLOGIST PROVIDED HISTORY: Reason for exam:->crush injury, thigh pain FINDINGS: There is no evidence of acute fracture. There is normal alignment. No acute joint abnormality. No focal osseous lesion. No focal soft tissue abnormality. No acute osseous abnormality. PROCEDURES   Unless otherwise noted below, none     Procedures    CRITICAL CARE TIME       CONSULTS:  None      EMERGENCY DEPARTMENT COURSE and DIFFERENTIAL DIAGNOSIS/MDM:   Vitals:    Vitals:    12/10/22 1600 12/10/22 1614 12/10/22 1615 12/10/22 1620   BP: (!) 132/90  113/75    Pulse: 76  65    Resp: 19  (!) 9 13   Temp:       TempSrc:       SpO2: 100% 100%     Weight:       Height:           Patient was given the following medications:  Medications   lactated ringers infusion 1,000 mL (0 mLs IntraVENous Stopped 12/10/22 1543)   morphine injection 4 mg (4 mg IntraVENous Given 12/10/22 1337)   ondansetron (ZOFRAN) injection 4 mg (4 mg IntraVENous Given 12/10/22 1336)   iopamidol (ISOVUE-370) 76 % injection 75 mL (75 mLs IntraVENous Given 12/10/22 1429)   orphenadrine (NORFLEX) injection 60 mg (60 mg IntraVENous Given 12/10/22 1552)         Is this patient to be included in the SEP-1 Core Measure due to severe sepsis or septic shock? No   Exclusion criteria - the patient is NOT to be included for SEP-1 Core Measure due to: Infection is not suspected    Patient presents for evaluation of abdomen and leg pain status post injury. On exam, she appears uncomfortable but is in no acute distress and nontoxic. Vitals are stable and she is afebrile. Lungs are clear to auscultation bilaterally. Chest is nontender. She does have a tenderness to the right lower quadrant and right flank but there is no edema or ecchymoses.   Also has generalized tenderness even with superficial touch to the right hip pelvis and proximal thigh. Compartments are soft. She has neurovascularly intact distally. Range of motion is intact. She was given morphine Zofran and Norflex for symptomatic relief and will be reevaluated. CBC and CMP are unremarkable. Urinalysis negative. CK1 27. Beta-hCG is negative. Lactate 0.7. X-ray of the right pelvis and femur show no acute osseous abnormality. X-ray of the abdomen pelvis shows no acute abnormalities. Patient was informed that symptoms may get worse before they get better and she would likely be more sore tomorrow. Note for work provided. She was also given prescriptions for naproxen and Robaxin. Symptomatic and supportive care discussed including rest and ice/heat. I estimate there is LOW risk for COMPARTMENT SYNDROME, DEEP VENOUS THROMBOSIS, SEPTIC ARTHRITIS, TENDON OR NEUROVASCULAR INJURY, thus I consider the discharge disposition reasonable. I see nothing that would suggest an acute abdomen at this time. Based on history, physical exam, risk factors, and tests my suspicion for bowel obstruction, incarcerated hernia, acute pancreatitis, intra-abdominal abscess, perforated viscus, diverticulitis, cholecystitis, appendicitis, PID, ovarian torsion, ectopic pregnancy and tubo-ovarian abscess is very low. There is no evidence of peritonitis, sepsis or toxicity at this time. I feel the patient can be managed as an outpatient with follow-up with her family doctor in 24-48 hours. Instructions have been given for the patient to return to the ED for worsening of the pain, high fevers, intractable vomiting, or bleeding. She is agreeable to this plan and stable for discharge at this time. FINAL IMPRESSION      1. Acute abdominal pain    2.  Contusion of hip and thigh, initial encounter          DISPOSITION/PLAN   DISPOSITION Decision To Discharge 12/10/2022 04:16:33 PM      PATIENT REFERRED TO:  Aurora Luis MD  [address]  [place] [facility]    Call   For a re-check in  4-5  days    [facility] Emergency Department  [address]  [address]  Go to   If symptoms worsen      DISCHARGE MEDICATIONS:  New Prescriptions    METHOCARBAMOL (ROBAXIN-750) 750 MG TABLET    Take 1-2 tablets by mouth every 8 hours as needed (muscle cramps or pain)    NAPROXEN (NAPROSYN) 500 MG TABLET    Take 1 tablet by mouth 2 times daily (with meals) for 10 days       DISCONTINUED MEDICATIONS:  Discontinued Medications    NAPROXEN (NAPROSYN) 500 MG TABLET    Take 1 tablet by mouth 2 times daily as needed for Pain              (Please note that portions of this note were completed with a voice recognition program.  Efforts were made to edit the dictations but occasionally words are mis-transcribed.)    Marcelo Bui PA-C (electronically signed)           Augie Evans PA-C  12/10/22 0223

## 2022-12-10 NOTE — Clinical Note
Adalgisa Rose was seen and treated in our emergency department on 12/10/2022. She may return to work on 12/13/2022. If you have any questions or concerns, please don't hesitate to call.       Alma Gilman, DO

## 2022-12-10 NOTE — Clinical Note
Cecilia Corral was seen and treated in our emergency department on 12/10/2022. She may return to work on 12/13/2022. If you have any questions or concerns, please don't hesitate to call.       Nadine Gilman, DO

## 2022-12-10 NOTE — ED PROVIDER NOTES
In addition to the advanced practice provider, I personally saw Olga Robins and performed a substantive portion of the visit including all aspects of the medical decision making. Briefly, this is a 42 y.o. female here for lower abdominal and right hip and thigh pain which began just prior to arrival after patient was wedged between a forklift and a pallet of products while at work. Patient states that the forklift malfunctioned, causing her to slip into this position. She denies any head injury or loss of consciousness. Pain is aching, severe in intensity. Worsened with touch and movement, nothing is seem to make it any better. She denies any weakness or numbness of the lower extremities. No  symptoms. On exam, patient afebrile and nontoxic. She appears in moderate painful distress from lower abdominal and right leg pain. Heart RRR. Lungs CTAB. Abdomen soft, nondistended, tender to palpation in right lower abdomen and suprapubic region. Negative Rovsing. No rebound, no rigidity, no guarding. No CVA tenderness. 5 out of 5 motor and sensation grossly intact bilateral lower extremities. PT 2+, easily palpable. Bilateral calf and thigh compartments are soft and symmetric. Tenderness with palpation along lateral right thigh. No overlying ecchymosis or other skin changes of abdominal wall or thighs. Of note, patient is primarily Indian-speaking, history and exam obtained with aid of  #[id number]. Screenings   [place] Coma Scale  Eye Opening: Spontaneous  Best Verbal Response: Oriented  Best Motor Response: Obeys commands  Danial Coma Scale Score: 15      Is this patient to be included in the SEP-1 Core Measure due to severe sepsis or septic shock? No   Exclusion criteria - the patient is NOT to be included for SEP-1 Core Measure due to:   Infection is not suspected      Procedures:      FAST Exam/Limited abdominal ultrasound  Date: 12/10/22  Indication: Blunt abdominal trauma  Consent: verbal    A bedside FAST ultrasound exam was conducted to assess for free fluid with clinical indication of trauma. Cardiac, RUQ, pelvic, and LUQ views were adequately obtained. There was no free fluid identified. The patient tolerated the procedure well and there were no complications        MDM    Patient afebrile and nontoxic. Arrived in moderate painful distress. She is alert and protecting her airway. No external evidence of head injury and patient denies. FAST exam performed on arrival was adequate and negative. No peritoneal signs on abdominal exam, CT imaging without evidence of obstruction, perforation, intra-abdominal hemorrhage or other acute finding. No lactic acidosis. Lower extremities neurovascularly intact, no findings to suggest compartment syndrome or limb ischemia. Plain films of hip, pelvis and right femur without acute fracture or dislocation. Remainder of laboratory work-up is reassuring. Patient felt improved with ED treatment, she was ambulatory in the emergency department unassisted. [facility] safe for discharge to self-care with continued conservative management. Patient agreeable with plan and feels comfortable returning to home. Return precautions were discussed. I Dr. Dash Malcolm am the primary clinician of record. Patient Referrals:  No follow-up provider specified. Discharge Medications:  New Prescriptions    No medications on file       FINAL IMPRESSION  1. Acute abdominal pain    2. Contusion of hip and thigh, initial encounter        Blood pressure 111/73, pulse 65, temperature 98.6 °F (37 °C), temperature source Oral, resp. rate 13, height 5' 5\" (1.651 m), weight 140 lb (63.5 kg), last menstrual period 11/18/2022, SpO2 100 %. For further details of [facility] emergency department encounter, please see documentation by advanced practice provider, Tennis Scriver, [place].     Lucina Tejada DO (electronically signed)  Attending Emergency Physician       Enrico Heimlich, DO  12/11/22 [address], DO  12/11/22 9934

## 2025-04-25 ENCOUNTER — APPOINTMENT (OUTPATIENT)
Dept: CT IMAGING | Age: 40
End: 2025-04-25

## 2025-04-25 ENCOUNTER — HOSPITAL ENCOUNTER (EMERGENCY)
Age: 40
Discharge: HOME | End: 2025-04-26
Attending: EMERGENCY MEDICINE

## 2025-04-25 ENCOUNTER — APPOINTMENT (OUTPATIENT)
Dept: GENERAL RADIOLOGY | Age: 40
End: 2025-04-25

## 2025-04-25 VITALS
HEART RATE: 70 BPM | BODY MASS INDEX: 24.96 KG/M2 | TEMPERATURE: 98.1 F | DIASTOLIC BLOOD PRESSURE: 76 MMHG | SYSTOLIC BLOOD PRESSURE: 125 MMHG | OXYGEN SATURATION: 100 % | WEIGHT: 150 LBS | RESPIRATION RATE: 15 BRPM

## 2025-04-25 DIAGNOSIS — S16.1XXA ACUTE STRAIN OF NECK MUSCLE, INITIAL ENCOUNTER: ICD-10-CM
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
## 2025-04-25 DIAGNOSIS — S30.1XXA CONTUSION OF ABDOMINAL WALL, INITIAL ENCOUNTER: ICD-10-CM

## 2025-04-25 DIAGNOSIS — S09.90XA CLOSED HEAD INJURY, INITIAL ENCOUNTER: ICD-10-CM

## 2025-04-25 DIAGNOSIS — K80.20 CALCULUS OF GALLBLADDER WITHOUT CHOLECYSTITIS WITHOUT OBSTRUCTION: ICD-10-CM

## 2025-04-25 LAB
ALBUMIN SERPL-MCNC: 4.2 G/DL (ref 3.4–5)
ALBUMIN/GLOB SERPL: 1.2 {RATIO} (ref 1.1–2.2)
ALP SERPL-CCNC: 104 U/L (ref 40–129)
ALT SERPL-CCNC: 16 U/L (ref 10–40)
ANION GAP SERPL CALCULATED.3IONS-SCNC: 9 MMOL/L (ref 3–16)
AST SERPL-CCNC: 19 U/L (ref 15–37)
BASOPHILS # BLD: 0 K/UL (ref 0–0.2)
BASOPHILS NFR BLD: 0.6 %
BILIRUB SERPL-MCNC: 0.3 MG/DL (ref 0–1)
BUN SERPL-MCNC: 10 MG/DL (ref 7–20)
CALCIUM SERPL-MCNC: 9.6 MG/DL (ref 8.3–10.6)
CHLORIDE SERPL-SCNC: 105 MMOL/L (ref 99–110)
CO2 SERPL-SCNC: 24 MMOL/L (ref 21–32)
CREAT SERPL-MCNC: 0.6 MG/DL (ref 0.6–1.1)
DEPRECATED RDW RBC AUTO: 14.5 % (ref 12.4–15.4)
EOSINOPHIL # BLD: 0.1 K/UL (ref 0–0.6)
EOSINOPHIL NFR BLD: 1.3 %
GFR SERPLBLD CREATININE-BSD FMLA CKD-EPI: >90 ML/MIN/{1.73_M2}
GLUCOSE SERPL-MCNC: 97 MG/DL (ref 70–99)
HCG SERPL QL: NEGATIVE
HCT VFR BLD AUTO: 37.6 % (ref 36–48)
HGB BLD-MCNC: 12.2 G/DL (ref 12–16)
LACTATE BLDV-SCNC: 0.7 MMOL/L (ref 0.4–1.9)
LIPASE SERPL-CCNC: 35 U/L (ref 13–60)
LYMPHOCYTES # BLD: 1.6 K/UL (ref 1–5.1)
LYMPHOCYTES NFR BLD: 30.3 %
MCH RBC QN AUTO: 25.9 PG (ref 26–34)
MCHC RBC AUTO-ENTMCNC: 32.4 G/DL (ref 31–36)
MCV RBC AUTO: 80 FL (ref 80–100)
MONOCYTES # BLD: 0.5 K/UL (ref 0–1.3)
MONOCYTES NFR BLD: 9.4 %
NEUTROPHILS # BLD: 3.2 K/UL (ref 1.7–7.7)
NEUTROPHILS NFR BLD: 58.4 %
PLATELET # BLD AUTO: 233 K/UL (ref 135–450)
PMV BLD AUTO: 8.3 FL (ref 5–10.5)
POTASSIUM SERPL-SCNC: 4.1 MMOL/L (ref 3.5–5.1)
PROT SERPL-MCNC: 7.8 G/DL (ref 6.4–8.2)
RBC # BLD AUTO: 4.71 M/UL (ref 4–5.2)
SODIUM SERPL-SCNC: 138 MMOL/L (ref 136–145)
WBC # BLD AUTO: 5.4 K/UL (ref 4–11)

## 2025-04-25 PROCEDURE — 84703 CHORIONIC GONADOTROPIN ASSAY: CPT

## 2025-04-25 PROCEDURE — 99285 EMERGENCY DEPT VISIT HI MDM: CPT

## 2025-04-25 PROCEDURE — 6360000002 HC RX W HCPCS: Performed by: EMERGENCY MEDICINE

## 2025-04-25 PROCEDURE — 70450 CT HEAD/BRAIN W/O DYE: CPT

## 2025-04-25 PROCEDURE — 73080 X-RAY EXAM OF ELBOW: CPT

## 2025-04-25 PROCEDURE — 96361 HYDRATE IV INFUSION ADD-ON: CPT

## 2025-04-25 PROCEDURE — 71260 CT THORAX DX C+: CPT

## 2025-04-25 PROCEDURE — 72125 CT NECK SPINE W/O DYE: CPT

## 2025-04-25 PROCEDURE — 83690 ASSAY OF LIPASE: CPT

## 2025-04-25 PROCEDURE — 83605 ASSAY OF LACTIC ACID: CPT

## 2025-04-25 PROCEDURE — 96375 TX/PRO/DX INJ NEW DRUG ADDON: CPT

## 2025-04-25 PROCEDURE — 6360000004 HC RX CONTRAST MEDICATION: Performed by: EMERGENCY MEDICINE

## 2025-04-25 PROCEDURE — 73030 X-RAY EXAM OF SHOULDER: CPT

## 2025-04-25 PROCEDURE — 96374 THER/PROPH/DIAG INJ IV PUSH: CPT

## 2025-04-25 PROCEDURE — 2580000003 HC RX 258: Performed by: EMERGENCY MEDICINE

## 2025-04-25 PROCEDURE — 80053 COMPREHEN METABOLIC PANEL: CPT

## 2025-04-25 PROCEDURE — 85025 COMPLETE CBC W/AUTO DIFF WBC: CPT

## 2025-04-25 RX ORDER — 0.9 % SODIUM CHLORIDE 0.9 %
1000 INTRAVENOUS SOLUTION INTRAVENOUS ONCE
Status: COMPLETED | OUTPATIENT
Start: 2025-04-25 | End: 2025-04-25

## 2025-04-25 RX ORDER — CYCLOBENZAPRINE HCL 10 MG
10 TABLET ORAL 3 TIMES DAILY PRN
Qty: 21 TABLET | Refills: 0 | Status: SHIPPED | OUTPATIENT
Start: 2025-04-25 | End: 2025-05-05

## 2025-04-25 RX ORDER — FENTANYL CITRATE 50 UG/ML
75 INJECTION, SOLUTION INTRAMUSCULAR; INTRAVENOUS
Status: DISCONTINUED | OUTPATIENT
Start: 2025-04-25 | End: 2025-04-26 | Stop reason: HOSPADM

## 2025-04-25 RX ORDER — ONDANSETRON 2 MG/ML
4 INJECTION INTRAMUSCULAR; INTRAVENOUS ONCE
Status: COMPLETED | OUTPATIENT
Start: 2025-04-25 | End: 2025-04-25

## 2025-04-25 RX ORDER — NAPROXEN 500 MG/1
500 TABLET ORAL 2 TIMES DAILY WITH MEALS
Qty: 20 TABLET | Refills: 0 | Status: SHIPPED | OUTPATIENT
Start: 2025-04-25 | End: 2025-05-05

## 2025-04-25 RX ORDER — IOPAMIDOL 755 MG/ML
75 INJECTION, SOLUTION INTRAVASCULAR
Status: COMPLETED | OUTPATIENT
Start: 2025-04-25 | End: 2025-04-25

## 2025-04-25 RX ADMIN — ONDANSETRON 4 MG: 2 INJECTION, SOLUTION INTRAMUSCULAR; INTRAVENOUS at 19:35

## 2025-04-25 RX ADMIN — SODIUM CHLORIDE 1000 ML: 9 INJECTION, SOLUTION INTRAVENOUS at 19:38

## 2025-04-25 RX ADMIN — FENTANYL CITRATE 75 MCG: 50 INJECTION INTRAMUSCULAR; INTRAVENOUS at 19:34

## 2025-04-25 RX ADMIN — IOPAMIDOL 75 ML: 755 INJECTION, SOLUTION INTRAVENOUS at 20:41

## 2025-04-25 ASSESSMENT — PAIN SCALES - GENERAL
PAINLEVEL_OUTOF10: 7
PAINLEVEL_OUTOF10: 9
PAINLEVEL_OUTOF10: 9

## 2025-04-25 ASSESSMENT — PAIN DESCRIPTION - LOCATION
LOCATION: BACK;NECK
LOCATION: ABDOMEN

## 2025-04-25 ASSESSMENT — LIFESTYLE VARIABLES
HOW MANY STANDARD DRINKS CONTAINING ALCOHOL DO YOU HAVE ON A TYPICAL DAY: PATIENT DOES NOT DRINK
HOW OFTEN DO YOU HAVE A DRINK CONTAINING ALCOHOL: NEVER

## 2025-04-25 ASSESSMENT — PAIN - FUNCTIONAL ASSESSMENT: PAIN_FUNCTIONAL_ASSESSMENT: 0-10

## 2025-04-27 NOTE — ED PROVIDER NOTES
Muscle spasms, Disp-21 tablet, R-0Normal             Discontinued Medications:  Discharge Medication List as of 4/26/2025 12:05 AM          This chart was generated using the Dragon dictation system. I created this record but it may contain dictation errors given the limitations of this technology.    Edi Gilman DO (electronically signed)  Attending Emergency Physician       Edi Gilman DO  04/27/25 1144